# Patient Record
Sex: MALE | Race: WHITE | NOT HISPANIC OR LATINO | URBAN - METROPOLITAN AREA
[De-identification: names, ages, dates, MRNs, and addresses within clinical notes are randomized per-mention and may not be internally consistent; named-entity substitution may affect disease eponyms.]

---

## 2024-02-07 ENCOUNTER — LAB (OUTPATIENT)
Dept: URBAN - METROPOLITAN AREA CLINIC 74 | Facility: CLINIC | Age: 24
End: 2024-02-07

## 2024-02-07 ENCOUNTER — OV NP (OUTPATIENT)
Dept: URBAN - METROPOLITAN AREA CLINIC 74 | Facility: CLINIC | Age: 24
End: 2024-02-07
Payer: COMMERCIAL

## 2024-02-07 VITALS
SYSTOLIC BLOOD PRESSURE: 118 MMHG | HEART RATE: 84 BPM | OXYGEN SATURATION: 97 % | TEMPERATURE: 97 F | BODY MASS INDEX: 41.3 KG/M2 | WEIGHT: 295 LBS | HEIGHT: 71 IN | DIASTOLIC BLOOD PRESSURE: 74 MMHG

## 2024-02-07 DIAGNOSIS — R11.2 NAUSEA AND VOMITING IN ADULT: ICD-10-CM

## 2024-02-07 DIAGNOSIS — E66.01 MORBID (SEVERE) OBESITY DUE TO EXCESS CALORIES: ICD-10-CM

## 2024-02-07 DIAGNOSIS — R63.4 WEIGHT LOSS, UNINTENTIONAL: ICD-10-CM

## 2024-02-07 DIAGNOSIS — R10.13 EPIGASTRIC PAIN: ICD-10-CM

## 2024-02-07 PROBLEM — 161615003: Status: ACTIVE | Noted: 2024-02-07

## 2024-02-07 PROBLEM — 111583006: Status: ACTIVE | Noted: 2024-02-07

## 2024-02-07 PROBLEM — 408512008: Status: ACTIVE | Noted: 2024-02-07

## 2024-02-07 PROBLEM — 448765001: Status: ACTIVE | Noted: 2024-02-07

## 2024-02-07 PROBLEM — 733460004: Status: ACTIVE | Noted: 2024-02-07

## 2024-02-07 PROBLEM — 16932000: Status: ACTIVE | Noted: 2024-02-07

## 2024-02-07 PROBLEM — 371434005: Status: ACTIVE | Noted: 2024-02-07

## 2024-02-07 PROBLEM — 79922009: Status: ACTIVE | Noted: 2024-02-07

## 2024-02-07 PROCEDURE — 99204 OFFICE O/P NEW MOD 45 MIN: CPT | Performed by: INTERNAL MEDICINE

## 2024-02-07 PROCEDURE — 99244 OFF/OP CNSLTJ NEW/EST MOD 40: CPT | Performed by: INTERNAL MEDICINE

## 2024-02-07 RX ORDER — SERTRALINE HYDROCHLORIDE 50 MG/1
TABLET ORAL
Qty: 30 TABLET | Status: ACTIVE | COMMUNITY

## 2024-02-07 RX ORDER — DIVALPROEX SODIUM 250 MG/1
TAKE 1 TABLET BY MOUTH TWICE A DAY FOR ANGER AND TO STABILZED MOOD TABLET, DELAYED RELEASE ORAL
Qty: 60 EACH | Refills: 1 | Status: ACTIVE | COMMUNITY

## 2024-02-07 RX ORDER — TRAZODONE HYDROCHLORIDE 50 MG/1
TAKE 1 TABLET BY MOUTH EVERY NIGHT AT BEDTIME FOR SLEEP TABLET ORAL
Qty: 30 EACH | Refills: 1 | Status: ACTIVE | COMMUNITY
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
## 2024-02-07 NOTE — PHYSICAL EXAM CONSTITUTIONAL:
Morbidly obese,well-developed, well nourished, in no acute distress, ambulating without difficulty, normal communication ability

## 2024-02-08 LAB
ABSOLUTE BASOPHILS: 71
ABSOLUTE EOSINOPHILS: 541
ABSOLUTE LYMPHOCYTES: 2366
ABSOLUTE MONOCYTES: 602
ABSOLUTE NEUTROPHILS: 6620
ALBUMIN/GLOBULIN RATIO: 1.9
ALBUMIN: 4.3
ALKALINE PHOSPHATASE: 73
ALT: 18
AST: 14
BASOPHILS: 0.7
BILIRUBIN, TOTAL: 0.6
BUN/CREATININE RATIO: (no result)
C-REACTIVE PROTEIN, QUANT: 8.9
CALCIUM: 9.2
CARBON DIOXIDE: 23
CHLORIDE: 108
CREATININE: 0.75
EGFR: 132
EOSINOPHILS: 5.3
FIB 4 INDEX: 0.2
GLOBULIN: 2.3
GLUCOSE: 85
HEMATOCRIT: 49.1
HEMOGLOBIN: 16.9
LYMPHOCYTES: 23.2
MCH: 29.1
MCHC: 34.4
MCV: 84.5
MONOCYTES: 5.9
MPV: 11.4
NEUTROPHILS: 64.9
PLATELET COUNT: 348
PLATELET COUNT: 348
POTASSIUM: 4.5
PROTEIN, TOTAL: 6.6
RDW: 12.7
RED BLOOD CELL COUNT: 5.81
SODIUM: 142
UREA NITROGEN (BUN): 8
WHITE BLOOD CELL COUNT: 10.2

## 2024-03-19 ENCOUNTER — EGD (OUTPATIENT)
Dept: URBAN - METROPOLITAN AREA SURGERY CENTER 30 | Facility: SURGERY CENTER | Age: 24
End: 2024-03-19

## 2024-03-21 ENCOUNTER — OV EP (OUTPATIENT)
Dept: URBAN - METROPOLITAN AREA CLINIC 74 | Facility: CLINIC | Age: 24
End: 2024-03-21

## 2024-03-26 ENCOUNTER — EGD (OUTPATIENT)
Dept: URBAN - METROPOLITAN AREA SURGERY CENTER 30 | Facility: SURGERY CENTER | Age: 24
End: 2024-03-26

## 2024-04-15 ENCOUNTER — OV EP (OUTPATIENT)
Dept: URBAN - METROPOLITAN AREA CLINIC 74 | Facility: CLINIC | Age: 24
End: 2024-04-15

## 2024-05-07 ENCOUNTER — TELEPHONE ENCOUNTER (OUTPATIENT)
Dept: URBAN - METROPOLITAN AREA CLINIC 74 | Facility: CLINIC | Age: 24
End: 2024-05-07

## 2025-05-09 ENCOUNTER — OFFICE VISIT (OUTPATIENT)
Dept: URBAN - METROPOLITAN AREA CLINIC 74 | Facility: CLINIC | Age: 25
End: 2025-05-09

## 2025-05-09 RX ORDER — TRAZODONE HYDROCHLORIDE 50 MG/1
TAKE 1 TABLET BY MOUTH EVERY NIGHT AT BEDTIME FOR SLEEP TABLET ORAL
Qty: 30 EACH | Refills: 1 | Status: ACTIVE | COMMUNITY

## 2025-05-09 RX ORDER — DIVALPROEX SODIUM 250 MG/1
TAKE 1 TABLET BY MOUTH TWICE A DAY FOR ANGER AND TO STABILZED MOOD TABLET, DELAYED RELEASE ORAL
Qty: 60 EACH | Refills: 1 | Status: ACTIVE | COMMUNITY

## 2025-05-09 RX ORDER — SERTRALINE HYDROCHLORIDE 50 MG/1
TABLET ORAL
Qty: 30 TABLET | Status: ACTIVE | COMMUNITY

## 2025-05-09 NOTE — HPI-TODAY'S VISIT:
Patient is 24-year-old male with past medical history as noted below known to Dr. Escamilla is presenting to our clinic today with recurrent abdominal pain and discomfort.  Diagnostic studies: -- CT scan of the abdomen and pelvis without IV contrast on 01/20/2025 noted nonobstructing 1 mm left lower pole nephrolithiasis.  Liver, gallbladder, pancreas, spleen, and adrenal glands are unremarkable.  The stomach, small bowel, and colon appear normal.  -- Labs on 01/20/2025 BMP with BUN 11, creatinine 0.9, sodium 141, potassium 3.9, and glucose 69.  CBC with WBC 14.4, hemoglobin 16.0, hematocrit 47.5, and platelet 337.  UA unremarkable.

## 2025-05-27 ENCOUNTER — DASHBOARD ENCOUNTERS (OUTPATIENT)
Age: 25
End: 2025-05-27

## 2025-06-02 ENCOUNTER — LAB OUTSIDE AN ENCOUNTER (OUTPATIENT)
Dept: URBAN - METROPOLITAN AREA CLINIC 74 | Facility: CLINIC | Age: 25
End: 2025-06-02

## 2025-06-02 ENCOUNTER — OFFICE VISIT (OUTPATIENT)
Dept: URBAN - METROPOLITAN AREA CLINIC 74 | Facility: CLINIC | Age: 25
End: 2025-06-02

## 2025-06-02 DIAGNOSIS — Z98.890 HISTORY OF REPAIR OF PYLORIC STENOSIS: ICD-10-CM

## 2025-06-02 DIAGNOSIS — R14.0 ABDOMINAL DISTENSION: ICD-10-CM

## 2025-06-02 DIAGNOSIS — Z79.899 LONG-TERM CURRENT USE OF PROTON PUMP INHIBITOR THERAPY: ICD-10-CM

## 2025-06-02 DIAGNOSIS — R68.81 EARLY SATIETY: ICD-10-CM

## 2025-06-02 DIAGNOSIS — R11.2 NAUSEA AND VOMITING IN ADULT: ICD-10-CM

## 2025-06-02 PROCEDURE — 99214 OFFICE O/P EST MOD 30 MIN: CPT | Performed by: PHYSICIAN ASSISTANT

## 2025-06-02 RX ORDER — SERTRALINE HYDROCHLORIDE 50 MG/1
TABLET ORAL
Qty: 30 TABLET | Status: ON HOLD | COMMUNITY

## 2025-06-02 RX ORDER — DIVALPROEX SODIUM 250 MG/1
TAKE 1 TABLET BY MOUTH TWICE A DAY FOR ANGER AND TO STABILZED MOOD TABLET, DELAYED RELEASE ORAL
Qty: 60 EACH | Refills: 1 | Status: ON HOLD | COMMUNITY

## 2025-06-02 RX ORDER — TRAZODONE HYDROCHLORIDE 50 MG/1
TAKE 1 TABLET BY MOUTH EVERY NIGHT AT BEDTIME FOR SLEEP TABLET ORAL
Qty: 30 EACH | Refills: 1 | Status: ON HOLD | COMMUNITY

## 2025-06-02 RX ORDER — OMEPRAZOLE 40 MG/1
1 CAPSULE 1/2 TO 1 HOUR BEFORE MORNING MEAL CAPSULE, DELAYED RELEASE ORAL ONCE A DAY
Qty: 90 | Refills: 1 | OUTPATIENT
Start: 2025-06-02

## 2025-06-02 NOTE — HPI-TODAY'S VISIT:
Patient is 24-year-old male with past medical history as noted below known to Dr. Escamilla is presenting to our clinic today with recurrent abdominal pain and discomfort. He has a long history of stomach discomfort, epigastric abdominal pain, nausea, vomiting, and fullness. He had pyloric stenosis surgery as a child. He has been atking OTC PPI and H2 blocker with poor response. He moves his bowels daily. Smokes Mrijuana occasionally. No ETOH or Tobacco.   Diagnostic studies: -- CT scan of the abdomen and pelvis without IV contrast on 01/20/2025 noted nonobstructing 1 mm left lower pole nephrolithiasis.  Liver, gallbladder, pancreas, spleen, and adrenal glands are unremarkable.  The stomach, small bowel, and colon appear normal.  -- Labs on 01/20/2025 BMP with BUN 11, creatinine 0.9, sodium 141, potassium 3.9, and glucose 69.  CBC with WBC 14.4, hemoglobin 16.0, hematocrit 47.5, and platelet 337.  UA unremarkable.